# Patient Record
Sex: FEMALE | Race: OTHER | URBAN - METROPOLITAN AREA
[De-identification: names, ages, dates, MRNs, and addresses within clinical notes are randomized per-mention and may not be internally consistent; named-entity substitution may affect disease eponyms.]

---

## 2023-01-26 ENCOUNTER — HOSPITAL ENCOUNTER (OUTPATIENT)
Dept: PHYSICAL THERAPY | Age: 48
Setting detail: THERAPIES SERIES
Discharge: HOME | End: 2023-01-26
Payer: MEDICAID

## 2023-01-26 PROCEDURE — 97161 PT EVAL LOW COMPLEX 20 MIN: CPT

## 2023-01-26 PROCEDURE — 97530 THERAPEUTIC ACTIVITIES: CPT

## 2023-01-26 NOTE — PLAN OF CARE
[address] [address], [address]  Phone: [phone]   Fax: [phone]                                                       Physical Therapy Certification    Dear JOSE Spence*  ,    We had the pleasure of evaluating the following patient for physical therapy services at [address] [place] [address]. A summary of our findings can be found in the initial assessment below. This includes our plan of care. If you have any questions or concerns regarding these findings, please do not hesitate to contact me at the office phone number checked above. Thank you for the referral.       Physician Signature:_______________________________Date:__________________  By signing above (or electronic signature), therapists plan is approved by physician      Patient: Shayy To   : 1975   MRN: [id number]  Referring Physician: JOSE Spence*        Evaluation Date: 2023      Medical Diagnosis Information:  Other bursitis of knee, left knee [M70.52]   PT diagnosis:  LLE gait deviations, decreased L hip strength                                     Insurance information: PT Insurance Information: [employer]. 30 visits JOSE mcpherson required through [facility]     Precautions/ Contra-indications:   Latex Allergy:  [x]NO      []YES  Preferred Language for Healthcare:   []English       [x]Other:  Uzbek,  present for eval    C-SSRS Triggered by Intake questionnaire (Past 2 wk assessment ):   [x] No, Questionnaire did not trigger screening.   [] Yes, Patient intake triggered C-SSRS Screening     [] Completed, no further action required.    [] Completed, PCP notified via Epic    SUBJECTIVE: Patient stated complaint:  Patient reports injuring medial aspect of her L knee about 10 years ago while cleaning, bent down and felt a pop, pain has been waxing and wanning since then. Is typically able to keep in controlled, uses Tylenol, however has been worsening over the last 6 months. Had cortisone injection on 12/30/22, medial aspect of knee helped, had lateral knee joint injection Aug/Sept that did not help. PCP ordered PT to further help. Pt is on her feet for 8 hours daily for work, pain steadily increases and all tasks are too difficult due to pain afterwards. Has not noticed any swelling. Has had an x-ray previously but can't remember the results, maybe OA. All extended standing and walking tasks increase pain, and L knee pain increases while driving. Able to sleep on her L side through the night. **After assess and discussing gait pattern, pt does report significant difficulty ambulating, unable to bend L knee and walked more like she was dragging it. Most recent cortisone injection improved that and pt feels she is bending her knee better and almost walking normal again. Relevant Medical History: no significant PMHx      Functional Scale:       Date assessed:  LEFS: raw score = 44; dysfunction = 45%  1/26    Pain Scale: 0-7/10  Easing factors: Tylenol helps a little  Provocative factors: extended periods of standing and walking; driving or sitting around the house  Type: []Constant   [x]Intermittent  []Radiating []Localized []other:  Numbness/Tingling: none    Occupation/School: works 8 hour shifts, packs small boxes, doesn't have to lift or carry boxes.   Enjoys riding bike    Living Status/Prior Level of Function:Prior to this injury / incident, pt was independent with ADLs and IADLs, similar L knee pain for about 10 years      OBJECTIVE:   Palpation: Tenderness medial L knee joint along MCL, varus/valgus stress tests negative with good joint mobility noted   Swelling noted distal, M/L patella but with good mobility, slight tenderness    Functional Mobility/Transfers: IND   Significant difficulty for pt to squat or lunge without excessive knee flexion. With max verbal, visual and manual cues, pt able to improve hinge technique to better protect knee joint with both squat and lunge. Posture: L knee hyperextension during static stand    Gait: pt snaps L knee into TKE just prior to initial contact      Lumbar AROM screen: [x] [facility]  [] abnormal:     PROM AROM    L R L R   Hip Flexion       Hip Abduction       Hip ER       Hip IR       Knee Flexion   WNL    Knee Extension   WNL    Dorsiflexion        Plantarflexion        Inversion        Eversion            Strength (0-5) / Myotomes Left Right   Hip Flexion - supine     Hip Flexion - seated (L1-2) 4+ 4   Hip Abduction 4- 4-   Hip Extension 4- 3+   Hip ER     Hip IR     Quads (L2-4) 5 5   Hamstrings 5 5   Ankle Dorsiflexion (L4-5) 5 5   Ankle Plantarflexion (S1-2)     Ankle Inversion     Ankle Eversion (S1-2)     Great Toe Extension (L5)          Flexibility     Hamstrings (90/90) Lacking 20 deg Lacking 21 deg   ITB Angela Sages)     Quads (Ely's) [facility] WFL   Hip Flexor Binh Memos)     Piriformis length Min impaired Min impaired       Joint mobility: L knee joint   [x]Normal    []Hypo   []Hyper    Orthopaedic Special Tests  Positive  Negative  NT Comments    Hip       KIERRA / Jose's  x     FADIR       Scour       Trendelenburg  x            Knee       Lachman's / Anterior Drawer       Posterior Drawer       Varus Stress  x     Valgus Stress  x     Froylan's        Appley's       Thessaly's       Patellar Tracking              Ankle   x    Anterior Drawer       Talar Tilt       Tirado       Brad's                                        [x] Patient history, allergies, meds reviewed. Medical chart reviewed. See intake form. Review Of Systems (ROS):  [x]Performed Review of systems (Integumentary, CardioPulmonary, Neurological) by intake and observation. Intake form has been scanned into medical record.  Patient has been instructed to contact their primary care physician regarding ROS issues if not already being addressed at this time.       Co-morbidities/Complexities (which will affect course of rehabilitation):   [x]None        []Hx of COVID   Arthritic conditions   []Rheumatoid arthritis (M05.9)  []Osteoarthritis (M19.91)  []Gout   Cardiovascular conditions   []Hypertension (I10)  []Hyperlipidemia (E78.5)  []Angina pectoris (I20)  []Atherosclerosis (I70)  []Pacemaker  []Hx of CABG/stent/  cardiac surgeries   Musculoskeletal conditions   []Disc pathology   []Congenital spine pathologies   []Osteoporosis (M81.8)  []Osteopenia (M85.8)  []Scoliosis       Endocrine conditions   []Hypothyroid (E03.9)  []Hyperthyroid Gastrointestinal conditions   []Constipation (C65.93)   Metabolic conditions   []Morbid obesity (E66.01)  []Diabetes type 1(E10.65) or 2 (E11.65)   []Neuropathy (G60.9)     Cardio/Pulmonary conditions   []Asthma (J45)  []Coughing   []COPD (J44.9)  []CHF  []A-fib   Psychological Disorders  []Anxiety (F41.9)  []Depression (F32.9)   []Other:   Developmental Disorders  []Autism (F84.0)  []CP (G80)  []Down Syndrome (Q90.9)  []Developmental delay     Neurological conditions  []Prior Stroke (I69.30)  []Parkinson's (G20)  []Encephalopathy (G93.40)  []MS (G35)  []Post-polio (G14)  []SCI  []TBI  []ALS Other conditions  []Fibromyalgia (M79.7)  []Vertigo  []Syncope  []Kidney Failure  []Cancer      []currently undergoing                treatment  []Pregnancy  []Incontinence   Prior surgeries  []involved limb  []previous spinal surgery  [] section birth  []hysterectomy  []bowel / bladder surgery  []other relevant surgeries   []Other:              Barriers to/and or personal factors that will affect rehab potential:              [x]Age  []Sex    []Smoker              [x]Motivation/Lack of Motivation                        []Co-Morbidities              []Cognitive Function, education/learning barriers              []Environmental, home barriers              [x]profession/work barriers  []past PT/medical experience  []other:    Falls Risk Assessment (30 days):   [x] Falls Risk assessed and no intervention required. [] Falls Risk assessed and Patient requires intervention due to being higher risk   TUG score (>12s at risk):     [] Falls education provided, including        ASSESSMENT:  Patient is a [address] yo female who reports medial L knee joint pain of about 10 years but has recently increased. Has cortisone injection about 1 month ago that helped, Tylenol doesn't really help the pain. Patient does present with decreased LLE strength, however is not significant and knee joint AROM is WNL. Pt does demo L knee hyperextension at times during static standing and snaps L knee into TKE just prior to initial contact of gait pattern, most likely irritating medial structures of L knee. Patient can benefit from PT services to address these deficits.       Functional Impairments:     []Noted lumbar/proximal hip/LE joint hypomobility   []Decreased LE functional ROM   [x]Decreased core/proximal hip strength and neuromuscular control   [x]Decreased LE functional strength   []Reduced balance/proprioceptive control   []other:      Functional Activity Limitations (from functional questionnaire and intake)   [x]Reduced ability to tolerate prolonged functional positions   []Reduced ability or difficulty with changes of positions or transfers between positions   [x]Reduced ability to maintain good posture and demonstrate good body mechanics with sitting, bending, and lifting   []Reduced ability to sleep   [x] Reduced ability or tolerance with driving and/or computer work   []Reduced ability to perform lifting, carrying tasks   [x]Reduced ability to squat   []Reduced ability to forward bend   [x]Reduced ability to ambulate prolonged functional periods/distances/surfaces   [x]Reduced ability to ascend/descend stairs   []Reduced ability to run, hop, cut or jump   []other:    Participation Restrictions   []Reduced participation in self care activities   []Reduced participation in home management activities   [x]Reduced participation in work activities   [x]Reduced participation in social activities. []Reduced participation in sport/recreation activities. Classification :    []Signs/symptoms consistent with post-surgical status including decreased ROM, strength and function.    [x]Signs/symptoms consistent with joint sprain/strain   []Signs/symptoms consistent with patella-femoral syndrome   []Signs/symptoms consistent with knee OA/hip OA   []Signs/symptoms consistent with internal derangement of knee/Hip   [x]Signs/symptoms consistent with functional hip weakness/NMR control      []Signs/symptoms consistent with tendinitis/tendinosis    []signs/symptoms consistent with pathology which may benefit from Dry needling      []other:      Prognosis/Rehab Potential:      []Excellent   [x]Good    []Fair   []Poor    Tolerance of evaluation/treatment:    []Excellent   [x]Good    []Fair   []Poor    Physical Therapy Evaluation Complexity Justification  [x] A history of present problem with:  [x] no personal factors and/or comorbidities that impact the plan of care;  []1-2 personal factors and/or comorbidities that impact the plan of care  []3 personal factors and/or comorbidities that impact the plan of care  [x] An examination of body systems using standardized tests and measures addressing any of the following: body structures and functions (impairments), activity limitations, and/or participation restrictions;:  [x] a total of 1-2 or more elements   [] a total of 3 or more elements   [] a total of 4 or more elements   [x] A clinical presentation with:  [x] stable and/or uncomplicated characteristics   [] evolving clinical presentation with changing characteristics  [] unstable and unpredictable characteristics;   [x] Clinical decision making of [x] Low, [] moderate, [] high complexity using standardized patient assessment instrument and/or measurable assessment of functional outcome. [x] EVAL (LOW) 59331 (typically 15 minutes face-to-face)  [] EVAL (MOD) 12565 (typically 30 minutes face-to-face)  [] EVAL (HIGH) 76059 (typically 45 minutes face-to-face)  [] RE-EVAL     PLAN:   Frequency/Duration:  2 days per week for 4 Weeks:  Interventions:  [x]  Therapeutic exercise including: strength training, ROM, for Lower extremity and core   [x]  NMR activation and proprioception for LE, Glutes and Core   [x]  Manual therapy as indicated for LE, Hip and spine to include: Dry Needling/IASTM, STM, PROM, Gr I-IV mobilizations, manipulation. [x] Modalities as needed that may include: thermal agents, E-stim, Biofeedback, US, iontophoresis as indicated  [x] Patient education on joint protection, postural re-education, activity modification, progression of HEP. HEP instruction: Written HEP instructions provided and reviewed. Access Code: [email or web address]  URL: [employer]/  Date: 01/26/2023  Prepared by: Mayela Fontenotir    Exercises  Seated Hamstring Stretch - 3 x daily - 7 x weekly - 3 sets - 30\" hold  Prone Terminal Knee Extension - 2 x daily - 7 x weekly - 1 sets - 10 reps  Squat with Chair Touch - 2 x daily - 7 x weekly - 1 sets - 10 reps  Lunge with Counter Support - 2 x daily - 7 x weekly - 1 sets - 10 reps      GOALS:  Patient stated goal:  alleviate pain  [] Progressing: [] Met: [] Not Met: [] Adjusted    Therapist goals for Patient:   Short Term Goals: To be achieved in: 2 weeks  1. Independent in HEP and progression per patient tolerance, in order to prevent re-injury. [] Progressing: [] Met: [] Not Met: [] Adjusted  2. Patient will have a decrease in pain to facilitate improvement in movement, function, and ADLs as indicated by Functional Deficits. [] Progressing: [] Met: [] Not Met: [] Adjusted    Long Term Goals: To be achieved in: 6 weeks  1. Pt will improve LEFS by 9 points to reduce disability and progress towards PLOF.    [] Progressing: [] Met: [] Not Met: [] Adjusted  2. Patient will demonstrate increased B hamstring length 90/90 AROM to lacking 16 deg to allow for normal knee joint function during gait cycle. [] Progressing: [] Met: [] Not Met: [] Adjusted  3. Patient will demonstrate an increase in B hip strength to at least 4+/5 in order to assist in stabilizing L knee joint at initial contact of gait cycle. [] Progressing: [] Met: [] Not Met: [] Adjusted  4. Patient will return to functional activities including driving without increased symptoms or restriction.    [] Progressing: [] Met: [] Not Met: [] Adjusted    Electronically signed by:  Cora Florez, PT

## 2023-01-27 NOTE — FLOWSHEET NOTE
[address]  Phone: [phone]   Fax: [phone]    Physical Therapy Daily Treatment Note    Date:  2023     Patient Name:  Shayy To    :  1975  MRN: [id number]  Medical Diagnosis:  Other bursitis of knee, left knee [M70.52]  Treatment Diagnosis: LLE gait deviations, decreased L hip strength     Insurance/Certification information:  PT Insurance Information: [employer]. 30 visits [place] required through [facility]  Physician Information:  JOSE Spence*    Plan of care signed (Y/N): []  Yes [x]  No     Date of Patient follow up with Physician:      Progress Report: []  Yes  [x]  No     Date Range for reporting period:  Beginnin2023  Ending:     Progress report due (10 Rx/or 30 days whichever is less): visit #8 or  (date)     Recertification due (POC duration/ or 90 days whichever is less): visit # or 23 (date)     Visit # Insurance Allowable Auth required? Date Range   Eval +  ?  [x]  Yes, [facility]  []  No pcy       Units approved Units used Date Range          Latex Allergy:  [x]NO      []YES  Preferred Language for Healthcare:   []English       [x]other: Mongolian, video  required    Functional Scale:       Date assessed:  LEFS: raw score = 44; dysfunction = 45%                 Pain level:  0-7/10     SUBJECTIVE:  See eval    OBJECTIVE: See eval      RESTRICTIONS/PRECAUTIONS:     Exercises/Interventions:   Therapeutic Exercise (21135)  Resistance / level Sets/sec Reps Notes / Cues   Bike  TM  -side stepping  -retro       IB / HR  HSS       Prone TKE       Cables   -TKE  -3-way hip                            Gait (71849)                     Therapeutic Activities (79285)       Fwd step up to SLS                            Neuromuscular Re-ed (27661)       AirEx       Shuttle balance              Manual Intervention (52829)       Knee mobs/PROM       Tib/Fem Mobs       Patella Mobs Ankle mobs                         Modalities:     Pt. Education:  01/26/2023  -pt educated on diagnosis, prognosis and expectations for rehab  -all pt questions were answered    Home Exercise Program:  Access Code: [email or web address]  URL: [email or web address]  Date: 01/26/2023  Prepared by: Rafiq Ayon     Exercises  Seated Hamstring Stretch - 3 x daily - 7 x weekly - 3 sets - 30\" hold  Prone Terminal Knee Extension - 2 x daily - 7 x weekly - 1 sets - 10 reps  Squat with Chair Touch - 2 x daily - 7 x weekly - 1 sets - 10 reps  Lunge with Counter Support - 2 x daily - 7 x weekly - 1 sets - 10 reps    Therapeutic Exercise and NMR EXR  [] (08291) Provided verbal/tactile cueing for activities related to strengthening, flexibility, endurance, ROM for improvements in LE, proximal hip, and core control with self care, mobility, lifting, ambulation.  [] (54904) Provided verbal/tactile cueing for activities related to improving balance, coordination, kinesthetic sense, posture, motor skill, proprioception  to assist with LE, proximal hip, and core control in self care, mobility, lifting, ambulation and eccentric single leg control.   [] (65060) Therapist is in constant attendance of 2 or more patients providing skilled therapy interventions, but not providing any significant amount of measurable one-on-one time to either patient, for improvements in LE, proximal hip, and core control in self care, mobility, lifting, ambulation and eccentric single leg control.      NMR and Therapeutic Activities:    [x] (52955 or 42458) Provided verbal/tactile cueing for activities related to improving balance, coordination, kinesthetic sense, posture, motor skill, proprioception and motor activation to allow for proper function of core, proximal hip and LE with self care and ADLs  [] (75428) Gait Re-education- Provided training and instruction to the patient for proper LE, core and proximal hip recruitment and positioning and eccentric body weight control with ambulation re-education including up and down stairs     Home Exercise Program:    [x] (81404) Reviewed/Progressed HEP activities related to strengthening, flexibility, endurance, ROM of core, proximal hip and LE for functional self-care, mobility, lifting and ambulation/stair navigation   [] (80117)Reviewed/Progressed HEP activities related to improving balance, coordination, kinesthetic sense, posture, motor skill, proprioception of core, proximal hip and LE for self care, mobility, lifting, and ambulation/stair navigation      Manual Treatments:  PROM / STM / Oscillations-Mobs:  G-I, II, III, IV (PA's, Inf., Post.)  [] (52010) Provided manual therapy to mobilize LE, proximal hip and/or LS spine soft tissue/joints for the purpose of modulating pain, promoting relaxation,  increasing ROM, reducing/eliminating soft tissue swelling/inflammation/restriction, improving soft tissue extensibility and allowing for proper ROM for normal function with self care, mobility, lifting and ambulation. Modalities:  [] (92939) Vasopneumatic compression: Utilized vasopneumatic compression to decrease edema / swelling for the purpose of improving mobility and quad tone / recruitment which will allow for increased overall function including but not limited to self-care, transfers, ambulation, and ascending / descending stairs.        Charges:  Timed Code Treatment Minutes: 32   Total Treatment Minutes: 60     [x] EVAL - LOW (92904)   [] EVAL - MOD (49959)  [] EVAL - HIGH (56985)  [] RE-EVAL (39209)  [] EK(57427) x       [] Ionto  [] NMR (88899) x       [] Vaso  [] Manual (73130) x       [] Ultrasound  [x] TA x  2      [] Mech Traction (74209)  [] Aquatic Therapy x     [] ES (un) (43427):   [] Home Management Training x  [] ES(attended) (57382)   [] Dry Needling 1-2 muscles (29555):  [] Dry Needling 3+ muscles (052880  [] Group:      [] Other:     GOALS:   Patient stated goal:  alleviate pain  [] Progressing: [] Met: [] Not Met: [] Adjusted     Therapist goals for Patient:   Short Term Goals: To be achieved in: 2 weeks  1. Independent in HEP and progression per patient tolerance, in order to prevent re-injury. [] Progressing: [] Met: [] Not Met: [] Adjusted  2. Patient will have a decrease in pain to facilitate improvement in movement, function, and ADLs as indicated by Functional Deficits. [] Progressing: [] Met: [] Not Met: [] Adjusted     Long Term Goals: To be achieved in: 6 weeks  1. Pt will improve LEFS by 9 points to reduce disability and progress towards PLOF. [] Progressing: [] Met: [] Not Met: [] Adjusted  2. Patient will demonstrate increased B hamstring length 90/90 AROM to lacking 16 deg to allow for normal knee joint function during gait cycle. [] Progressing: [] Met: [] Not Met: [] Adjusted  3. Patient will demonstrate an increase in B hip strength to at least 4+/5 in order to assist in stabilizing L knee joint at initial contact of gait cycle. [] Progressing: [] Met: [] Not Met: [] Adjusted  4. Patient will return to functional activities including driving without increased symptoms or restriction. [] Progressing: [] Met: [] Not Met: [] Adjusted    Overall Progression Towards Functional goals/ Treatment Progress Update:  [] Patient is progressing as expected towards functional goals listed. [] Progression is slowed due to complexities/Impairments listed. [] Progression has been slowed due to co-morbidities.   [x] Plan just implemented, too soon to assess goals progression <30days   [] Goals require adjustment due to lack of progress  [] Patient is not progressing as expected and requires additional follow up with physician  [] Other    Persisting Functional Limitations/Impairments:  []Sitting []Standing   []Walking []Stairs   []Transfers []ADLs   []Squatting/bending []Kneeling  []Housework []Job related tasks  []Driving []Sports/Recreation   []Sleeping []Other:    ASSESSMENT: See eval  Treatment/Activity Tolerance:  [x] Pt able to complete treatment [] Patient limited by fatique  [] Patient limited by pain  [] Patient limited by other medical complications  [] Other:     Prognosis:  [x] Good [] Fair  [] Poor    Patient Requires Follow-up: [x] Yes  [] No    Return to Play:    [x]  N/A   []  Stage 1: Intro to Strength   []  Stage 2: Return to Run and Strength   []  Stage 3: Return to Jump and Strength   []  Stage 4: Dynamic Strength and Agility   []  Stage 5: Sport Specific Training     []  Ready to Return to Play, Meets All Above Stages   []  Not Ready for Return to Sports   Comments:            PLAN: See eval. PT 2x / week for 6 weeks. Pending PA  [] Continue per plan of care [] Alter current plan (see comments)  [x] Plan of care initiated [] Hold pending MD visit [] Discharge    Electronically signed by: Angely Valentino, PT, DPT      Note: If patient does not return for scheduled/ recommended follow up visits, this note will serve as a discharge from care along with most recent update on progress.

## 2023-02-09 ENCOUNTER — HOSPITAL ENCOUNTER (OUTPATIENT)
Dept: PHYSICAL THERAPY | Age: 48
Setting detail: THERAPIES SERIES
Discharge: HOME | End: 2023-02-09
Payer: MEDICAID

## 2023-02-09 PROCEDURE — 97110 THERAPEUTIC EXERCISES: CPT

## 2023-02-09 PROCEDURE — 97140 MANUAL THERAPY 1/> REGIONS: CPT

## 2023-02-09 NOTE — FLOWSHEET NOTE
[address]  Phone: [phone]   Fax: [phone]    Physical Therapy Daily Treatment Note    Date:  2023     Patient Name:  Renée Walsh    :  1975  MRN: [id number]  Medical Diagnosis:  Other bursitis of knee, left knee [M70.52]  Treatment Diagnosis: LLE gait deviations, decreased L hip strength     Insurance/Certification information:  PT Insurance Information: [employer]. 30 visits JOSE mcpherson required through [facility]  Physician Information:  ROSANNE Lackey    Plan of care signed (Y/N): []  Yes [x]  No     Date of Patient follow up with Physician:      Progress Report: []  Yes  [x]  No     Date Range for reporting period:  Beginnin2023  Ending:     Progress report due (10 Rx/or 30 days whichever is less): visit #8 or 31 (date)     Recertification due (POC duration/ or 90 days whichever is less): visit # or 23 (date)     Visit # Insurance Allowable Auth required? Date Range   Eval +  32 units, 8 visits [x]  Yes, CCP  []  No  to 3/11/23       Units approved Units used Date Range   32 3 3/11/23     Latex Allergy:  [x]NO      []YES  Preferred Language for Healthcare:   []English       [x]other: Chinese, video  required    Functional Scale:       Date assessed:  LEFS: raw score = 44; dysfunction = 45%                 Pain level:  0-7/10     SUBJECTIVE:   Has been doing okay since last session, has been performing HEP, no trouble with them, they don't cause any pain but does still have pain at the same place as it has been. Work has been going well but is now having pain in her hands.        OBJECTIVE:   : RLE crosses midline during gait w/hip ER      RESTRICTIONS/PRECAUTIONS:     Exercises/Interventions:   Therapeutic Exercise (81218)  Resistance / level Sets/sec Reps Notes / Cues   Bike  TM  -side stepping  -retro 1.0 5 mins     IB / HR  HSS  2x30\"/2x10     Prone TKE       Cables -TKE  -3-way hip   3.5, 4.5 pl  1.5 pl   1, 1  1   15 L  10 ea B                         Gait (06860)                     Therapeutic Activities (54259)       Fwd step up to SLS  Lat step up w/abd kick 6\"  6\" 1  1 10 B  10 B No UE assist  No UE assist                        Neuromuscular Re-ed (36417)       AirEx       Shuttle balance              Manual Intervention (49225)       Knee mobs/PROM       Tib/Fem Mobs       Patella Mobs into each direction, lateral gapping  2 mins     Ankle mobs       Stretches:  Hamstring 90/90  Piriformis   IT-band    3x30\" L  30\" L  3x30\" L     IASTM w/roller stick to Medial L knee joint, MCL, hip add tubercle  3 mins         Modalities:     Pt. Education:  01/26/2023  -pt educated on diagnosis, prognosis and expectations for rehab  -all pt questions were answered    Home Exercise Program:  Access Code: [email or web address]  URL: [email or web address]  Date: 01/26/2023  Prepared by: Jonas Tran     Exercises  Seated Hamstring Stretch - 3 x daily - 7 x weekly - 3 sets - 30\" hold  Prone Terminal Knee Extension - 2 x daily - 7 x weekly - 1 sets - 10 reps  Squat with Chair Touch - 2 x daily - 7 x weekly - 1 sets - 10 reps  Lunge with Counter Support - 2 x daily - 7 x weekly - 1 sets - 10 reps    Therapeutic Exercise and NMR EXR  [x] (66976) Provided verbal/tactile cueing for activities related to strengthening, flexibility, endurance, ROM for improvements in LE, proximal hip, and core control with self care, mobility, lifting, ambulation.  [] (76062) Provided verbal/tactile cueing for activities related to improving balance, coordination, kinesthetic sense, posture, motor skill, proprioception  to assist with LE, proximal hip, and core control in self care, mobility, lifting, ambulation and eccentric single leg control.   [] (43154) Therapist is in constant attendance of 2 or more patients providing skilled therapy interventions, but not providing any significant amount of measurable one-on-one time to either patient, for improvements in LE, proximal hip, and core control in self care, mobility, lifting, ambulation and eccentric single leg control. NMR and Therapeutic Activities:    [x] (37116 or 89166) Provided verbal/tactile cueing for activities related to improving balance, coordination, kinesthetic sense, posture, motor skill, proprioception and motor activation to allow for proper function of core, proximal hip and LE with self care and ADLs  [] (94768) Gait Re-education- Provided training and instruction to the patient for proper LE, core and proximal hip recruitment and positioning and eccentric body weight control with ambulation re-education including up and down stairs     Home Exercise Program:    [x] (27919) Reviewed/Progressed HEP activities related to strengthening, flexibility, endurance, ROM of core, proximal hip and LE for functional self-care, mobility, lifting and ambulation/stair navigation   [] (75311)Reviewed/Progressed HEP activities related to improving balance, coordination, kinesthetic sense, posture, motor skill, proprioception of core, proximal hip and LE for self care, mobility, lifting, and ambulation/stair navigation      Manual Treatments:  PROM / STM / Oscillations-Mobs:  G-I, II, III, IV (PA's, Inf., Post.)  [] (14969) Provided manual therapy to mobilize LE, proximal hip and/or LS spine soft tissue/joints for the purpose of modulating pain, promoting relaxation,  increasing ROM, reducing/eliminating soft tissue swelling/inflammation/restriction, improving soft tissue extensibility and allowing for proper ROM for normal function with self care, mobility, lifting and ambulation.      Modalities:  [] (56110) Vasopneumatic compression: Utilized vasopneumatic compression to decrease edema / swelling for the purpose of improving mobility and quad tone / recruitment which will allow for increased overall function including but not limited to self-care, transfers, ambulation, and ascending / descending stairs. Charges:  Timed Code Treatment Minutes: 40   Total Treatment Minutes: 40     [] EVAL - LOW (26048)   [] EVAL - MOD (30566)  [] EVAL - HIGH (15561)  [] RE-EVAL (79464)  [x] RF(46476) x  2     [] Ionto  [] NMR (09917) x       [] Vaso  [x] Manual (98835) x  1     [] Ultrasound  [] TA x        [] Mech Traction (60082)  [] Aquatic Therapy x     [] ES (un) (86486):   [] Home Management Training x  [] ES(attended) (91213)   [] Dry Needling 1-2 muscles (63160):  [] Dry Needling 3+ muscles (137758  [] Group:      [] Other:     GOALS:   Patient stated goal:  alleviate pain  [] Progressing: [] Met: [] Not Met: [] Adjusted     Therapist goals for Patient:   Short Term Goals: To be achieved in: 2 weeks  1. Independent in HEP and progression per patient tolerance, in order to prevent re-injury. [] Progressing: [] Met: [] Not Met: [] Adjusted  2. Patient will have a decrease in pain to facilitate improvement in movement, function, and ADLs as indicated by Functional Deficits. [] Progressing: [] Met: [] Not Met: [] Adjusted     Long Term Goals: To be achieved in: 6 weeks  1. Pt will improve LEFS by 9 points to reduce disability and progress towards PLOF. [] Progressing: [] Met: [] Not Met: [] Adjusted  2. Patient will demonstrate increased B hamstring length 90/90 AROM to lacking 16 deg to allow for normal knee joint function during gait cycle. [] Progressing: [] Met: [] Not Met: [] Adjusted  3. Patient will demonstrate an increase in B hip strength to at least 4+/5 in order to assist in stabilizing L knee joint at initial contact of gait cycle. [] Progressing: [] Met: [] Not Met: [] Adjusted  4. Patient will return to functional activities including driving without increased symptoms or restriction.    [] Progressing: [] Met: [] Not Met: [] Adjusted    Overall Progression Towards Functional goals/ Treatment Progress Update:  [] Patient is progressing as expected towards functional goals listed. [] Progression is slowed due to complexities/Impairments listed. [] Progression has been slowed due to co-morbidities. [x] Plan just implemented, too soon to assess goals progression <30days   [] Goals require adjustment due to lack of progress  [] Patient is not progressing as expected and requires additional follow up with physician  [] Other    Persisting Functional Limitations/Impairments:  []Sitting []Standing   []Walking []Stairs   []Transfers []ADLs   []Squatting/bending []Kneeling  []Housework []Job related tasks  []Driving []Sports/Recreation   []Sleeping []Other:    ASSESSMENT:   Patient tolerated all activity well, request increase in weight during TKE at cables, increase exercise intensity and use of unstable surfaces next visit. Continue stretches to IT-band to minimize stresses to medial L knee joint. Treatment/Activity Tolerance:  [x] Pt able to complete treatment [] Patient limited by fatique  [] Patient limited by pain  [] Patient limited by other medical complications  [] Other:     Prognosis:  [x] Good [] Fair  [] Poor    Patient Requires Follow-up: [x] Yes  [] No    Return to Play:    [x]  N/A   []  Stage 1: Intro to Strength   []  Stage 2: Return to Run and Strength   []  Stage 3: Return to Jump and Strength   []  Stage 4: Dynamic Strength and Agility   []  Stage 5: Sport Specific Training     []  Ready to Return to Play, Meets All Above Stages   []  Not Ready for Return to Sports   Comments:            PLAN: See eval. PT 2x / week for 6 weeks. Pending PA  [x] Continue per plan of care [] Alter current plan (see comments)  [] Plan of care initiated [] Hold pending MD visit [] Discharge    Electronically signed by: Fantasma Dent, PT, DPT      Note: If patient does not return for scheduled/ recommended follow up visits, this note will serve as a discharge from care along with most recent update on progress.

## 2023-02-14 ENCOUNTER — HOSPITAL ENCOUNTER (OUTPATIENT)
Dept: PHYSICAL THERAPY | Age: 48
Setting detail: THERAPIES SERIES
Discharge: HOME | End: 2023-02-14
Payer: MEDICAID

## 2023-02-14 PROCEDURE — 97112 NEUROMUSCULAR REEDUCATION: CPT

## 2023-02-14 PROCEDURE — 97110 THERAPEUTIC EXERCISES: CPT

## 2023-02-14 PROCEDURE — 97140 MANUAL THERAPY 1/> REGIONS: CPT

## 2023-02-14 PROCEDURE — 97530 THERAPEUTIC ACTIVITIES: CPT

## 2023-02-14 NOTE — FLOWSHEET NOTE
[address]  Phone: [phone]   Fax: [phone]    Physical Therapy Daily Treatment Note    Date:  2023     Patient Name:  Kimani Dooley    :  1975  MRN: [id number]  Medical Diagnosis:  Other bursitis of knee, left knee [M70.52]  Treatment Diagnosis: LLE gait deviations, decreased L hip strength     Insurance/Certification information:  PT Insurance Information: [employer]. 30 visits JOSE mcpherson required through [facility]  Physician Information:  ROSANNE Medina    Plan of care signed (Y/N): []  Yes [x]  No     Date of Patient follow up with Physician:      Progress Report: []  Yes  [x]  No     Date Range for reporting period:  Beginnin2023  Ending:     Progress report due (10 Rx/or 30 days whichever is less): visit #8 or  (date)     Recertification due (POC duration/ or 90 days whichever is less): visit # or 23 (date)     Visit # Insurance Allowable Auth required? Date Range   Eval + 3/8 32 units, 8 visits [x]  Yes, [facility]  []  No  to 3/11/23       Latex Allergy:  [x]NO      []YES  Preferred Language for Healthcare:   []English       [x]other: Mongolian, video  required    Functional Scale:       Date assessed:  LEFS: raw score = 44; dysfunction = 45%                 Pain level:  7/10     SUBJECTIVE:   Patient reports the L knee is hurting today. She did feel better after the second session.      OBJECTIVE:   : RLE crosses midline during gait w/hip ER  : Crepitus noted in all directions with L patellar mobs       RESTRICTIONS/PRECAUTIONS:     Exercises/Interventions:   Therapeutic Exercise (54978)  Resistance / level Sets/sec Reps Notes / Cues   Bike  TM  -side stepping  -retro 1.0 5 mins     IB / HR  HSS  2x30\"/2x10  2 x 30\" B     Prone TKE       Cables   -TKE  -3-way hip   4.5 pl  1.5 pl   1  1   15 L  10 ea B                         Gait (96564)                     Therapeutic Activities ([address])       Fwd step up to SLS  Lat step up w/abd kick 6\"  6\" 1  1 10 B  10 B No UE assist  No UE assist                        Neuromuscular Re-ed (28092)       AirEx  - NBOS w/ trunk twists  - NBOS w/ OH lifts  - SLS   Yellow ball  Yellow ball   30\"  30\"  3 x 15\" B     Shuttle balance       Tiltboard - A/P, M/L taps  1 min ea     Tiltboard - A/P, M/L balance  1 min ea                   Manual Intervention (50457)       Knee mobs/PROM       Tib/Fem Mobs       Patella Mobs into each direction, lateral gapping  2 mins     Ankle mobs       Stretches:  Hamstring 90/90  Piriformis   IT-band        IASTM w/roller stick to Medial L knee joint, MCL, hip add tubercle      K-tape Ystrip applied L knee with anchor on patellar tendon, Lateral tail at 50% around patella, medial tail <25%   6 min  0/60: Application time included pt educated on removal process, wear time       Modalities:     Pt. Education:  01/26/2023  -pt educated on diagnosis, prognosis and expectations for rehab  -all pt questions were answered    Home Exercise Program:  Access Code: [email or web address]  URL: [email or web address]  Date: 01/26/2023  Prepared by: Nikolay Santizo     Exercises  Seated Hamstring Stretch - 3 x daily - 7 x weekly - 3 sets - 30\" hold  Prone Terminal Knee Extension - 2 x daily - 7 x weekly - 1 sets - 10 reps  Squat with Chair Touch - 2 x daily - 7 x weekly - 1 sets - 10 reps  Lunge with Counter Support - 2 x daily - 7 x weekly - 1 sets - 10 reps    Therapeutic Exercise and NMR EXR  [x] (91949) Provided verbal/tactile cueing for activities related to strengthening, flexibility, endurance, ROM for improvements in LE, proximal hip, and core control with self care, mobility, lifting, ambulation.  [] (79113) Provided verbal/tactile cueing for activities related to improving balance, coordination, kinesthetic sense, posture, motor skill, proprioception  to assist with LE, proximal hip, and core control in self care, mobility, lifting, ambulation and eccentric single leg control.   [] (85211) Therapist is in constant attendance of 2 or more patients providing skilled therapy interventions, but not providing any significant amount of measurable one-on-one time to either patient, for improvements in LE, proximal hip, and core control in self care, mobility, lifting, ambulation and eccentric single leg control. NMR and Therapeutic Activities:    [x] (91562 or 87796) Provided verbal/tactile cueing for activities related to improving balance, coordination, kinesthetic sense, posture, motor skill, proprioception and motor activation to allow for proper function of core, proximal hip and LE with self care and ADLs  [] (96144) Gait Re-education- Provided training and instruction to the patient for proper LE, core and proximal hip recruitment and positioning and eccentric body weight control with ambulation re-education including up and down stairs     Home Exercise Program:    [x] (00131) Reviewed/Progressed HEP activities related to strengthening, flexibility, endurance, ROM of core, proximal hip and LE for functional self-care, mobility, lifting and ambulation/stair navigation   [] (89541)Reviewed/Progressed HEP activities related to improving balance, coordination, kinesthetic sense, posture, motor skill, proprioception of core, proximal hip and LE for self care, mobility, lifting, and ambulation/stair navigation      Manual Treatments:  PROM / STM / Oscillations-Mobs:  G-I, II, III, IV (PA's, Inf., Post.)  [] (05069) Provided manual therapy to mobilize LE, proximal hip and/or LS spine soft tissue/joints for the purpose of modulating pain, promoting relaxation,  increasing ROM, reducing/eliminating soft tissue swelling/inflammation/restriction, improving soft tissue extensibility and allowing for proper ROM for normal function with self care, mobility, lifting and ambulation.      Modalities:  [] (51900) Vasopneumatic compression: Utilized vasopneumatic compression to decrease edema / swelling for the purpose of improving mobility and quad tone / recruitment which will allow for increased overall function including but not limited to self-care, transfers, ambulation, and ascending / descending stairs. Charges:  Timed Code Treatment Minutes: 54   Total Treatment Minutes: 54     CPT Code Units approved Units used Date Range   60002 TA 32 1 1/26/23 - 3/11/23   95375 Gait 32     27943 TE 32 3    11525 Manual 32 2    31434 NMR 32 1        [] EVAL - LOW (60074)   [] EVAL - MOD (75840)  [] EVAL - HIGH (82105)  [] RE-EVAL (85396)  [x] SC(09017) x  1     [] Ionto  [x] NMR (24542) x 1       [] Vaso  [x] Manual (52227) x  1     [] Ultrasound  [x] TA x  1      [] Mech Traction (66563)  [] Aquatic Therapy x     [] ES (un) (12051):   [] Home Management Training x  [] ES(attended) (08433)   [] Dry Needling 1-2 muscles (76054):  [] Dry Needling 3+ muscles (381429  [] Group:      [] Other:     GOALS:   Patient stated goal:  alleviate pain  [] Progressing: [] Met: [] Not Met: [] Adjusted     Therapist goals for Patient:   Short Term Goals: To be achieved in: 2 weeks  1. Independent in HEP and progression per patient tolerance, in order to prevent re-injury. [] Progressing: [] Met: [] Not Met: [] Adjusted  2. Patient will have a decrease in pain to facilitate improvement in movement, function, and ADLs as indicated by Functional Deficits. [] Progressing: [] Met: [] Not Met: [] Adjusted     Long Term Goals: To be achieved in: 6 weeks  1. Pt will improve LEFS by 9 points to reduce disability and progress towards PLOF. [] Progressing: [] Met: [] Not Met: [] Adjusted  2. Patient will demonstrate increased B hamstring length 90/90 AROM to lacking 16 deg to allow for normal knee joint function during gait cycle. [] Progressing: [] Met: [] Not Met: [] Adjusted  3.  Patient will demonstrate an increase in B hip strength to at least 4+/5 in order to assist in stabilizing L knee joint at initial contact of gait cycle. [] Progressing: [] Met: [] Not Met: [] Adjusted  4. Patient will return to functional activities including driving without increased symptoms or restriction. [] Progressing: [] Met: [] Not Met: [] Adjusted    Overall Progression Towards Functional goals/ Treatment Progress Update:  [] Patient is progressing as expected towards functional goals listed. [] Progression is slowed due to complexities/Impairments listed. [] Progression has been slowed due to co-morbidities. [x] Plan just implemented, too soon to assess goals progression <30days   [] Goals require adjustment due to lack of progress  [] Patient is not progressing as expected and requires additional follow up with physician  [] Other    Persisting Functional Limitations/Impairments:  []Sitting []Standing   []Walking []Stairs   []Transfers []ADLs   []Squatting/bending []Kneeling  []Housework []Job related tasks  []Driving []Sports/Recreation   []Sleeping []Other:    ASSESSMENT:   Continued with quad and glute strengthening exercises with cues to contract glutes and quads during single leg stance activities. Patient demo's crepitus with L patellar mobs. Ktape applied to L knee for patellar support. Pt instructed in wear time, removal process, and  adverse signs to watch for. Will continue with LE flexibility and strength to reduce pain and improve pt function.    Treatment/Activity Tolerance:  [x] Pt able to complete treatment [] Patient limited by fatique  [] Patient limited by pain  [] Patient limited by other medical complications  [] Other:     Prognosis:  [x] Good [] Fair  [] Poor    Patient Requires Follow-up: [x] Yes  [] No    Return to Play:    [x]  N/A   []  Stage 1: Intro to Strength   []  Stage 2: Return to Run and Strength   []  Stage 3: Return to Jump and Strength   []  Stage 4: Dynamic Strength and Agility   []  Stage 5: Sport Specific Training     []  Ready to Return to Play, Meets All Above Stages   []  Not Ready for Return to Sports   Comments:            PLAN: See eval. PT 2x / week for 6 weeks. Pending PA  [x] Continue per plan of care [] Alter current plan (see comments)  [] Plan of care initiated [] Hold pending MD visit [] Discharge    Electronically signed by: Sarah Brooks PT, DPT      Note: If patient does not return for scheduled/ recommended follow up visits, this note will serve as a discharge from care along with most recent update on progress.

## 2023-02-18 ENCOUNTER — APPOINTMENT (OUTPATIENT)
Dept: PHYSICAL THERAPY | Age: 48
End: 2023-02-18
Payer: MEDICAID

## 2023-02-21 ENCOUNTER — HOSPITAL ENCOUNTER (OUTPATIENT)
Dept: PHYSICAL THERAPY | Age: 48
Setting detail: THERAPIES SERIES
Discharge: HOME | End: 2023-02-21
Payer: MEDICAID

## 2023-02-21 PROCEDURE — 97530 THERAPEUTIC ACTIVITIES: CPT

## 2023-02-21 PROCEDURE — 97112 NEUROMUSCULAR REEDUCATION: CPT

## 2023-02-21 PROCEDURE — 97110 THERAPEUTIC EXERCISES: CPT

## 2023-02-21 NOTE — FLOWSHEET NOTE
[address]  Phone: [phone]   Fax: [phone]    Physical Therapy Daily Treatment Note    Date:  2023     Patient Name:  Den Noble    :  1975  MRN: [id number]  Medical Diagnosis:  Other bursitis of knee, left knee [M70.52]  Treatment Diagnosis: LLE gait deviations, decreased L hip strength     Insurance/Certification information:  PT Insurance Information: [employer]. 30 visits JOSE mcpherson required through [facility]  Physician Information:  ROSANNE Duncan    Plan of care signed (Y/N): []  Yes [x]  No     Date of Patient follow up with Physician:      Progress Report: []  Yes  [x]  No     Date Range for reporting period:  Beginnin2023  Ending:     Progress report due (10 Rx/or 30 days whichever is less): visit #8 or 62 (date)     Recertification due (POC duration/ or 90 days whichever is less): visit # or 23 (date)     Visit # Insurance Allowable Auth required? Date Range   Eval +  32 units, 8 visits [x]  Yes, [facility]  []  No  to 3/11/23       Latex Allergy:  [x]NO      []YES  Preferred Language for Healthcare:   []English       [x]other: Ukrainian, video  required    Functional Scale:       Date assessed:  LEFS: raw score = 44; dysfunction = 45%                 Pain level:  4/10     SUBJECTIVE:   Video  used. Pt reports her knees don't hurt that much today. After last session she did hurt a lot but she thinks that was more due to walking at work. Did feel the Ktape helped last session.      OBJECTIVE:   : RLE crosses midline during gait w/hip ER  : Crepitus noted in all directions with L patellar mobs       RESTRICTIONS/PRECAUTIONS:     Exercises/Interventions:   Therapeutic Exercise (17992)  Resistance / level Sets/sec Reps Notes / Cues   Bike  TM  -side stepping  -retro 1.0 5 mins     IB / HR  HSS  2x30\"/2x10  2 x 30\" B     Prone TKE       Cables   -TKE  -3-way hip 4.5 pl  1.5 pl                         Gait (06231)                     Therapeutic Activities (67428)       Fwd step up to SLS  Lat step up w/abd kick 6\"  6\" 1  1 10 B  10 B No UE assist  No UE assist                        Neuromuscular Re-ed (88594)       AirEx  - NBOS w/ trunk twists  - NBOS w/ OH lifts  - SLS   Yellow ball  Yellow ball   3 x 15\" B     Shuttle balance       Tiltboard - A/P, M/L taps  1 min ea     Tiltboard - A/P, M/L balance  1 min ea     Lunges forward on Bosu Blue side  10 B    Lateral lunges on Bosu Blue side   10 B    Weight shifts right/left on Bosu Black side 1 min     Squats on Bosu Black side  20           Manual Intervention (88951)       Knee mobs/PROM       Tib/Fem Mobs       Patella Mobs into each direction, lateral gapping      Ankle mobs       Stretches:  Hamstring 90/90  Piriformis   IT-band        IASTM w/roller stick to Medial L knee joint, MCL, hip add tubercle      K-tape Ystrip applied L knee with anchor on patellar tendon, Lateral tail at 50% around patella, medial tail <25%   4 min  3/61: Application time included pt educated on removal process, wear time       Modalities:     Pt. Education:  01/26/2023  -pt educated on diagnosis, prognosis and expectations for rehab  -all pt questions were answered    Home Exercise Program:  Access Code: [email or web address]  URL: [email or web address]  Date: 01/26/2023  Prepared by: Leigh Hall     Exercises  Seated Hamstring Stretch - 3 x daily - 7 x weekly - 3 sets - 30\" hold  Prone Terminal Knee Extension - 2 x daily - 7 x weekly - 1 sets - 10 reps  Squat with Chair Touch - 2 x daily - 7 x weekly - 1 sets - 10 reps  Lunge with Counter Support - 2 x daily - 7 x weekly - 1 sets - 10 reps    Therapeutic Exercise and NMR EXR  [x] (53158) Provided verbal/tactile cueing for activities related to strengthening, flexibility, endurance, ROM for improvements in LE, proximal hip, and core control with self care, mobility, lifting, ambulation.  [] (89437) Provided verbal/tactile cueing for activities related to improving balance, coordination, kinesthetic sense, posture, motor skill, proprioception  to assist with LE, proximal hip, and core control in self care, mobility, lifting, ambulation and eccentric single leg control.   [] (38428) Therapist is in constant attendance of 2 or more patients providing skilled therapy interventions, but not providing any significant amount of measurable one-on-one time to either patient, for improvements in LE, proximal hip, and core control in self care, mobility, lifting, ambulation and eccentric single leg control.      NMR and Therapeutic Activities:    [x] (42655 or 96514) Provided verbal/tactile cueing for activities related to improving balance, coordination, kinesthetic sense, posture, motor skill, proprioception and motor activation to allow for proper function of core, proximal hip and LE with self care and ADLs  [] (70982) Gait Re-education- Provided training and instruction to the patient for proper LE, core and proximal hip recruitment and positioning and eccentric body weight control with ambulation re-education including up and down stairs     Home Exercise Program:    [] (10916) Reviewed/Progressed HEP activities related to strengthening, flexibility, endurance, ROM of core, proximal hip and LE for functional self-care, mobility, lifting and ambulation/stair navigation   [] (39896)Reviewed/Progressed HEP activities related to improving balance, coordination, kinesthetic sense, posture, motor skill, proprioception of core, proximal hip and LE for self care, mobility, lifting, and ambulation/stair navigation      Manual Treatments:  PROM / STM / Oscillations-Mobs:  G-I, II, III, IV (PA's, Inf., Post.)  [] (53540) Provided manual therapy to mobilize LE, proximal hip and/or LS spine soft tissue/joints for the purpose of modulating pain, promoting relaxation,  increasing ROM, reducing/eliminating soft tissue swelling/inflammation/restriction, improving soft tissue extensibility and allowing for proper ROM for normal function with self care, mobility, lifting and ambulation. Modalities:  [] (05623) Vasopneumatic compression: Utilized vasopneumatic compression to decrease edema / swelling for the purpose of improving mobility and quad tone / recruitment which will allow for increased overall function including but not limited to self-care, transfers, ambulation, and ascending / descending stairs. Charges:  Timed Code Treatment Minutes: 42   Total Treatment Minutes: 42     CPT Code Units approved Units used Date Range   87913 TA 32 2 1/26/23 - 3/11/23   55709 Gait 32     66962 TE 32 4    51565 Manual 32 2    45154 NMR 32 2        [] EVAL - LOW (14816)   [] EVAL - MOD (49964)  [] EVAL - HIGH (93547)  [] RE-EVAL (51859)  [x] BM(69608) x  1     [] Ionto  [x] NMR (00725) x 1       [] Vaso  [] Manual (59786) x      [] Ultrasound  [x] TA x  1      [] Mech Traction (85706)  [] Aquatic Therapy x     [] ES (un) (84416):   [] Home Management Training x  [] ES(attended) (87497)   [] Dry Needling 1-2 muscles (63339):  [] Dry Needling 3+ muscles (288057  [] Group:      [] Other:     GOALS:   Patient stated goal:  alleviate pain  [] Progressing: [] Met: [] Not Met: [] Adjusted     Therapist goals for Patient:   Short Term Goals: To be achieved in: 2 weeks  1. Independent in HEP and progression per patient tolerance, in order to prevent re-injury. [] Progressing: [] Met: [] Not Met: [] Adjusted  2. Patient will have a decrease in pain to facilitate improvement in movement, function, and ADLs as indicated by Functional Deficits. [] Progressing: [] Met: [] Not Met: [] Adjusted     Long Term Goals: To be achieved in: 6 weeks  1. Pt will improve LEFS by 9 points to reduce disability and progress towards PLOF. [] Progressing: [] Met: [] Not Met: [] Adjusted  2.  Patient will demonstrate increased B hamstring length 90/90 AROM to lacking 16 deg to allow for normal knee joint function during gait cycle. [] Progressing: [] Met: [] Not Met: [] Adjusted  3. Patient will demonstrate an increase in B hip strength to at least 4+/5 in order to assist in stabilizing L knee joint at initial contact of gait cycle. [] Progressing: [] Met: [] Not Met: [] Adjusted  4. Patient will return to functional activities including driving without increased symptoms or restriction. [] Progressing: [] Met: [] Not Met: [] Adjusted    Overall Progression Towards Functional goals/ Treatment Progress Update:  [] Patient is progressing as expected towards functional goals listed. [] Progression is slowed due to complexities/Impairments listed. [] Progression has been slowed due to co-morbidities. [x] Plan just implemented, too soon to assess goals progression <30days   [] Goals require adjustment due to lack of progress  [] Patient is not progressing as expected and requires additional follow up with physician  [] Other    Persisting Functional Limitations/Impairments:  []Sitting []Standing   []Walking []Stairs   []Transfers []ADLs   []Squatting/bending []Kneeling  []Housework []Job related tasks  []Driving []Sports/Recreation   []Sleeping []Other:    ASSESSMENT:   COntinued with quad and glute strengthening this date with added Bosu activities. Reapplied Ktape as pt had relief last session. Will continue with strength as pt tolerates to return to PLOF.     Treatment/Activity Tolerance:  [x] Pt able to complete treatment [] Patient limited by fatique  [] Patient limited by pain  [] Patient limited by other medical complications  [] Other:     Prognosis:  [x] Good [] Fair  [] Poor    Patient Requires Follow-up: [x] Yes  [] No    Return to Play:    [x]  N/A   []  Stage 1: Intro to Strength   []  Stage 2: Return to Run and Strength   []  Stage 3: Return to Jump and Strength   []  Stage 4: Dynamic Strength and Agility   []  Stage 5: Sport Specific Training     []  Ready to Return to Play, Meets All Above Stages   []  Not Ready for Return to Sports   Comments:            PLAN: See eval. PT 2x / week for 6 weeks. Pending PA  [x] Continue per plan of care [] Alter current plan (see comments)  [] Plan of care initiated [] Hold pending MD visit [] Discharge    Electronically signed by: Gloria Sams PT, DPT      Note: If patient does not return for scheduled/ recommended follow up visits, this note will serve as a discharge from care along with most recent update on progress.

## 2023-02-23 ENCOUNTER — HOSPITAL ENCOUNTER (OUTPATIENT)
Dept: PHYSICAL THERAPY | Age: 48
Setting detail: THERAPIES SERIES
Discharge: HOME | End: 2023-02-23
Payer: MEDICAID

## 2023-02-23 PROCEDURE — 97110 THERAPEUTIC EXERCISES: CPT

## 2023-02-23 PROCEDURE — 97530 THERAPEUTIC ACTIVITIES: CPT

## 2023-02-23 NOTE — PROGRESS NOTES
[address]  Phone: [phone]   Fax: [phone]    Physical Therapy Daily Treatment Note    Date:  2023     Patient Name:  Kendrick Terry    :  1975  MRN: [id number]  Medical Diagnosis:  Other bursitis of knee, left knee [M70.52]  Treatment Diagnosis: LLE gait deviations, decreased L hip strength     Insurance/Certification information:  PT Insurance Information: [employer]. 30 visits JOSE mcpherson required through [facility]  Physician Information:  JOSE Kim*    Plan of care signed (Y/N): []  Yes [x]  No     Date of Patient follow up with Physician:      Progress Report: [x]  Yes  []  No     Date Range for reporting period:  Beginnin2023  PN:   Ending:     Progress report due (10 Rx/or 30 days whichever is less): visit #8 or  (date)     Recertification due (POC duration/ or 90 days whichever is less): visit # or 23 (date)     Visit # Insurance Allowable Auth required? Date Range   Eval +  32 units, 8 visits [x]  Yes, [facility]  []  No  to 3/11/23       Latex Allergy:  [x]NO      []YES  Preferred Language for Healthcare:   []English       [x]other: Vietnamese, video  required    Functional Scale:       Date assessed:  LEFS: raw score = 44; dysfunction = 45%                 Pain level:  4/10     SUBJECTIVE:   Video  used. Doing well today, pain is about the same, feels that K-tape is helping, still has it on from last session. Does feel that PT is helping. Can't report anything that continues to give her difficulty at home or here in clinic. Driving has gotten better, doesn't hurt as much.       OBJECTIVE:   : Supine hamstring 90/90: lacking 10 deg R, lacking 19 deg L    : RLE crosses midline during gait w/hip ER  : Crepitus noted in all directions with L patellar mobs       RESTRICTIONS/PRECAUTIONS:     Exercises/Interventions:   Therapeutic Exercise (24134) Resistance / level Sets/sec Reps Notes / Cues   Bike  TM  -side stepping  -retro 2.0 5 mins     IB / HR  HSS   6\" block 2x30\"/2x10  2 x 30\" B     Prone TKE       Cables   -TKE  -3-way hip   4.5 pl  1.5 pl    Total gym  -squats    2   10    Cybex leg press  Quantum   Knee flex  Knee ext 60#    35#  20# 1    1  1 15 L    15  15           Gait (60947)                     Therapeutic Activities (15542)       Fwd step up to SLS  Lat step up w/abd kick 6\"  6\" No UE assist  No UE assist   Fwd step up from 2nd step w/contra LE to platform  6\" 1 10 L No UE assist                 Neuromuscular Re-ed (76330)       AirEx  - NBOS w/ trunk twists  - NBOS w/ OH lifts  - SLS   Yellow ball  Yellow ball       Shuttle balance       Tiltboard - A/P, M/L taps      Tiltboard - A/P, M/L balance      Walking lunges 40'  1 2/23: demo good form following initial instruction   Lunges forward on Bosu Blue side     Lateral lunges on Bosu Blue side      Weight shifts right/left on Bosu Black side     Squats on Bosu Black side  15    Fwd step ups to SLS on BOSU  Lat step ups to SLS on [employer]   10 B  10 B    Manual Intervention (17890)       Knee mobs/PROM       Tib/Fem Mobs       Patella Mobs into each direction, lateral gapping      Ankle mobs       Stretches:  Hamstring 90/90  Piriformis   IT-band        IASTM w/roller stick to Medial L knee joint, MCL, hip add tubercle      K-tape Ystrip applied L knee with anchor on patellar tendon, Lateral tail at 50% around patella, medial tail <87%    5/58: Application time included pt educated on removal process, wear time       Modalities:     Pt. Education:  01/26/2023  -pt educated on diagnosis, prognosis and expectations for rehab  -all pt questions were answered    Home Exercise Program:  Access Code: [email or web address]  URL: [email or web address]  Date: 01/26/2023  Prepared by: Desirae King     Exercises  Seated Hamstring Stretch - 3 x daily - 7 x weekly - 3 sets - 30\" hold  Prone Terminal Knee Extension - 2 x daily - 7 x weekly - 1 sets - 10 reps  Squat with Chair Touch - 2 x daily - 7 x weekly - 1 sets - 10 reps  Lunge with Counter Support - 2 x daily - 7 x weekly - 1 sets - 10 reps    Therapeutic Exercise and NMR EXR  [x] (04036) Provided verbal/tactile cueing for activities related to strengthening, flexibility, endurance, ROM for improvements in LE, proximal hip, and core control with self care, mobility, lifting, ambulation.  [] (44596) Provided verbal/tactile cueing for activities related to improving balance, coordination, kinesthetic sense, posture, motor skill, proprioception  to assist with LE, proximal hip, and core control in self care, mobility, lifting, ambulation and eccentric single leg control.   [] (53179) Therapist is in constant attendance of 2 or more patients providing skilled therapy interventions, but not providing any significant amount of measurable one-on-one time to either patient, for improvements in LE, proximal hip, and core control in self care, mobility, lifting, ambulation and eccentric single leg control.      NMR and Therapeutic Activities:    [x] (44887 or 64595) Provided verbal/tactile cueing for activities related to improving balance, coordination, kinesthetic sense, posture, motor skill, proprioception and motor activation to allow for proper function of core, proximal hip and LE with self care and ADLs  [] (72871) Gait Re-education- Provided training and instruction to the patient for proper LE, core and proximal hip recruitment and positioning and eccentric body weight control with ambulation re-education including up and down stairs     Home Exercise Program:    [] (76452) Reviewed/Progressed HEP activities related to strengthening, flexibility, endurance, ROM of core, proximal hip and LE for functional self-care, mobility, lifting and ambulation/stair navigation   [] (86912)Reviewed/Progressed HEP activities related to improving balance, coordination, kinesthetic sense, posture, motor skill, proprioception of core, proximal hip and LE for self care, mobility, lifting, and ambulation/stair navigation      Manual Treatments:  PROM / STM / Oscillations-Mobs:  G-I, II, III, IV (PA's, Inf., Post.)  [] (67133) Provided manual therapy to mobilize LE, proximal hip and/or LS spine soft tissue/joints for the purpose of modulating pain, promoting relaxation,  increasing ROM, reducing/eliminating soft tissue swelling/inflammation/restriction, improving soft tissue extensibility and allowing for proper ROM for normal function with self care, mobility, lifting and ambulation. Modalities:  [] (82336) Vasopneumatic compression: Utilized vasopneumatic compression to decrease edema / swelling for the purpose of improving mobility and quad tone / recruitment which will allow for increased overall function including but not limited to self-care, transfers, ambulation, and ascending / descending stairs. Charges:  Timed Code Treatment Minutes: 45   Total Treatment Minutes: 45     CPT Code Units approved Units used Date Range   93471 TA 32 3 1/26/23 - 3/11/23   29848 Gait 32     02360 TE 32 6    03449 Manual 32 2    60473 NMR 32 2        [] EVAL - LOW (32607)   [] EVAL - MOD (13001)  [] EVAL - HIGH (81054)  [] RE-EVAL (27773)  [x] PN(36500) x  2     [] Ionto  [] NMR (49664) x        [] Vaso  [] Manual (32021) x      [] Ultrasound  [x] TA x  1      [] Mech Traction (94913)  [] Aquatic Therapy x     [] ES (un) (33749):   [] Home Management Training x  [] ES(attended) (46026)   [] Dry Needling 1-2 muscles (85728):  [] Dry Needling 3+ muscles (082620  [] Group:      [] Other:     GOALS:   Patient stated goal:  alleviate pain  [x] Progressing: [] Met: [] Not Met: [] Adjusted     Therapist goals for Patient:   Short Term Goals: To be achieved in: 2 weeks  1. Independent in HEP and progression per patient tolerance, in order to prevent re-injury.    [] Progressing: [x] Met: [] Not Met: [] Adjusted  2. Patient will have a decrease in pain to facilitate improvement in movement, function, and ADLs as indicated by Functional Deficits. [x] Progressing: [] Met: [] Not Met: [] Adjusted     Long Term Goals: To be achieved in: 6 weeks  1. Pt will improve LEFS by 9 points to reduce disability and progress towards PLOF. [x] Progressing: [] Met: [] Not Met: [] Adjusted  2. Patient will demonstrate increased B hamstring length 90/90 AROM to lacking 16 deg to allow for normal knee joint function during gait cycle. [] Progressing: [x] Met: for R [x] Not Met: for L  [] Adjusted  3. Patient will demonstrate an increase in B hip strength to at least 4+/5 in order to assist in stabilizing L knee joint at initial contact of gait cycle. [x] Progressing: [] Met: [] Not Met: [] Adjusted  4. Patient will return to functional activities including driving without increased symptoms or restriction. [x] Progressing: [] Met: [] Not Met: [] Adjusted    Overall Progression Towards Functional goals/ Treatment Progress Update:  [x] Patient is progressing as expected towards functional goals listed. [] Progression is slowed due to complexities/Impairments listed. [] Progression has been slowed due to co-morbidities. [] Plan just implemented, too soon to assess goals progression <30days   [] Goals require adjustment due to lack of progress  [] Patient is not progressing as expected and requires additional follow up with physician  [] Other    Persisting Functional Limitations/Impairments:  []Sitting []Standing   []Walking []Stairs   []Transfers []ADLs   []Squatting/bending []Kneeling  []Housework []Job related tasks  []Driving []Sports/Recreation   []Sleeping []Other:    ASSESSMENT:    Patient's R hamstring length improved, L is about the same.   Pt demo'd good form w/walking lunglorraine following initial instruction and reported that she could be even more challenged during session, so pt performed LE machines. Assess pt's response and consider educating pt on activities in [facility] or adding TRX ex's to flow sheet. Treatment/Activity Tolerance:  [x] Pt able to complete treatment [] Patient limited by fatique  [] Patient limited by pain  [] Patient limited by other medical complications  [] Other:     Prognosis:  [x] Good [] Fair  [] Poor    Patient Requires Follow-up: [x] Yes  [] No    Return to Play:    [x]  N/A   []  Stage 1: Intro to Strength   []  Stage 2: Return to Run and Strength   []  Stage 3: Return to Jump and Strength   []  Stage 4: Dynamic Strength and Agility   []  Stage 5: Sport Specific Training     []  Ready to Return to Play, Meets All Above Stages   []  Not Ready for Return to Sports   Comments:            PLAN: See eval. PT 2x / week for 6 weeks. Pending PA  [x] Continue per plan of care [] Alter current plan (see comments)  [] Plan of care initiated [] Hold pending MD visit [] Discharge    Electronically signed by: Kira Epps PT, DPT      Note: If patient does not return for scheduled/ recommended follow up visits, this note will serve as a discharge from care along with most recent update on progress.

## 2023-02-28 ENCOUNTER — HOSPITAL ENCOUNTER (OUTPATIENT)
Dept: PHYSICAL THERAPY | Age: 48
Setting detail: THERAPIES SERIES
Discharge: HOME | End: 2023-02-28
Payer: MEDICAID

## 2023-02-28 PROCEDURE — 97530 THERAPEUTIC ACTIVITIES: CPT

## 2023-02-28 PROCEDURE — 97110 THERAPEUTIC EXERCISES: CPT

## 2023-02-28 PROCEDURE — 97112 NEUROMUSCULAR REEDUCATION: CPT

## 2023-02-28 NOTE — PROGRESS NOTES
[address]  Phone: [phone]   Fax: [phone]    Physical Therapy Daily Treatment Note    Date:  2023     Patient Name:  Calvin Peterson    :  1975  MRN: [id number]  Medical Diagnosis:  Other bursitis of knee, left knee [M70.52]  Treatment Diagnosis: LLE gait deviations, decreased L hip strength     Insurance/Certification information:  PT Insurance Information: [employer]. 30 visits JOSE mcpherson required through [facility]  Physician Information:  JOSE Mejia*    Plan of care signed (Y/N): []  Yes [x]  No     Date of Patient follow up with Physician:      Progress Report: []  Yes  [x]  No     Date Range for reporting period:  Beginnin2023  PN:   Ending:     Progress report due (10 Rx/or 30 days whichever is less): visit #8 or  (date)     Recertification due (POC duration/ or 90 days whichever is less): visit # or 23 (date)     Visit # Insurance Allowable Auth required? Date Range   Eval +  32 units, 8 visits [x]  Yes, [facility]  []  No  to 3/11/23       Latex Allergy:  [x]NO      []YES  Preferred Language for Healthcare:   []English       [x]other: [place]an, video  required    Functional Scale:       Date assessed:  LEFS: raw score = 44; dysfunction = 45%                 Pain level:  0/10     SUBJECTIVE:   Video  used. Patient reports today she feels really good. She took the Meribeth Nila off yesterday and would like it reapplied today.      OBJECTIVE:   : Supine hamstring 90/90: lacking 10 deg R, lacking 19 deg L    : RLE crosses midline during gait w/hip ER  : Crepitus noted in all directions with L patellar mobs       RESTRICTIONS/PRECAUTIONS:     Exercises/Interventions:   Therapeutic Exercise (09179)  Resistance / level Sets/sec Reps Notes / Cues   Bike  TM  -side stepping  -retro 2.0 5 mins     IB / HR  HSS   6\" block 2x30\"/2x10  2 x 30\" B     Prone TKE Cables   -TKE  -3-way hip   4.5 pl  1.5 pl    Total gym  -squats     Cybex leg press  Quantum   Knee flex  Knee ext 60#    30#  30# 1    1  1 15 L    15  15    Cybex leg press -double leg (plate setting 4 413# 1 10                          Gait (65307)                     Therapeutic Activities (07493)       Fwd step up to SLS  Lat step up w/abd kick 6\"  6\" No UE assist  No UE assist   Fwd step up from 2nd step w/contra LE to platform  6\" 1 10 L No UE assist   Side stepping  Lime TB 20 ft B                          Neuromuscular Re-ed (14570)       AirEx  - NBOS w/ trunk twists  - NBOS w/ OH lifts  - SLS   Yellow ball  Yellow ball       Shuttle balance       Tiltboard - A/P, M/L taps      Tiltboard - A/P, M/L balance      Walking lunges 40'  2/23: demo good form following initial instruction   Lunges forward on Bosu Blue side     Lateral lunges on Bosu Blue side      Weight shifts right/left on Bosu Black side 1 min     Squats on Bosu Black side  20    Fwd step ups to SLS on BOSU  Lat step ups to SLS on [employer]   10 B  10 B                                Manual Intervention (57120)       Knee mobs/PROM       Tib/Fem Mobs       Patella Mobs into each direction, lateral gapping      Ankle mobs       Stretches:  Hamstring 90/90  Piriformis   IT-band        IASTM w/roller stick to Medial L knee joint, MCL, hip add tubercle      K-tape Ystrip applied L knee with anchor on patellar tendon, Lateral tail at 50% around patella, medial tail <25%   4 min  8/38: Application time included pt educated on removal process, wear time       Modalities:     Pt. Education:  01/26/2023  -pt educated on diagnosis, prognosis and expectations for rehab  -all pt questions were answered    Home Exercise Program:  Access Code: [email or web address]  URL: [email or web address]  Date: 01/26/2023  Prepared by: Liana Magaña     Exercises  Seated Hamstring Stretch - 3 x daily - 7 x weekly - 3 sets - 30\" hold  Prone Terminal Knee Extension - 2 x daily - 7 x weekly - 1 sets - 10 reps  Squat with Chair Touch - 2 x daily - 7 x weekly - 1 sets - 10 reps  Lunge with Counter Support - 2 x daily - 7 x weekly - 1 sets - 10 reps    Therapeutic Exercise and NMR EXR  [x] (38999) Provided verbal/tactile cueing for activities related to strengthening, flexibility, endurance, ROM for improvements in LE, proximal hip, and core control with self care, mobility, lifting, ambulation.  [] (96095) Provided verbal/tactile cueing for activities related to improving balance, coordination, kinesthetic sense, posture, motor skill, proprioception  to assist with LE, proximal hip, and core control in self care, mobility, lifting, ambulation and eccentric single leg control.   [] (79723) Therapist is in constant attendance of 2 or more patients providing skilled therapy interventions, but not providing any significant amount of measurable one-on-one time to either patient, for improvements in LE, proximal hip, and core control in self care, mobility, lifting, ambulation and eccentric single leg control.      NMR and Therapeutic Activities:    [x] (55123 or 53428) Provided verbal/tactile cueing for activities related to improving balance, coordination, kinesthetic sense, posture, motor skill, proprioception and motor activation to allow for proper function of core, proximal hip and LE with self care and ADLs  [] (28788) Gait Re-education- Provided training and instruction to the patient for proper LE, core and proximal hip recruitment and positioning and eccentric body weight control with ambulation re-education including up and down stairs     Home Exercise Program:    [] (83566) Reviewed/Progressed HEP activities related to strengthening, flexibility, endurance, ROM of core, proximal hip and LE for functional self-care, mobility, lifting and ambulation/stair navigation   [] (69465)Reviewed/Progressed HEP activities related to improving balance, coordination, kinesthetic sense, posture, motor skill, proprioception of core, proximal hip and LE for self care, mobility, lifting, and ambulation/stair navigation      Manual Treatments:  PROM / STM / Oscillations-Mobs:  G-I, II, III, IV (PA's, Inf., Post.)  [] (86788) Provided manual therapy to mobilize LE, proximal hip and/or LS spine soft tissue/joints for the purpose of modulating pain, promoting relaxation,  increasing ROM, reducing/eliminating soft tissue swelling/inflammation/restriction, improving soft tissue extensibility and allowing for proper ROM for normal function with self care, mobility, lifting and ambulation. Modalities:  [] (65794) Vasopneumatic compression: Utilized vasopneumatic compression to decrease edema / swelling for the purpose of improving mobility and quad tone / recruitment which will allow for increased overall function including but not limited to self-care, transfers, ambulation, and ascending / descending stairs. Charges:  Timed Code Treatment Minutes: 46   Total Treatment Minutes: 46     CPT Code Units approved Units used Date Range   56244 TA 32 4 1/26/23 - 3/11/23   28425 Gait 32     30187 TE 32 7    43752 Manual 32 2    94058 NMR 32 3        [] EVAL - LOW (88368)   [] EVAL - MOD (33768)  [] EVAL - HIGH (73635)  [] RE-EVAL (25914)  [x] CORRIE(77585) x  1     [] Ionto  [x] NMR (76939) x  1      [] Vaso  [] Manual (42640) x      [] Ultrasound  [x] TA x  1      [] Mech Traction (98069)  [] Aquatic Therapy x     [] ES (un) (82968):   [] Home Management Training x  [] ES(attended) (28892)   [] Dry Needling 1-2 muscles (48884):  [] Dry Needling 3+ muscles (173363  [] Group:      [] Other:     GOALS:   Patient stated goal:  alleviate pain  [x] Progressing: [] Met: [] Not Met: [] Adjusted     Therapist goals for Patient:   Short Term Goals: To be achieved in: 2 weeks  1. Independent in HEP and progression per patient tolerance, in order to prevent re-injury.    [] Progressing: [x] Met: [] Not Met: [] Adjusted  2. Patient will have a decrease in pain to facilitate improvement in movement, function, and ADLs as indicated by Functional Deficits. [x] Progressing: [] Met: [] Not Met: [] Adjusted     Long Term Goals: To be achieved in: 6 weeks  1. Pt will improve LEFS by 9 points to reduce disability and progress towards PLOF. [x] Progressing: [] Met: [] Not Met: [] Adjusted  2. Patient will demonstrate increased B hamstring length 90/90 AROM to lacking 16 deg to allow for normal knee joint function during gait cycle. [] Progressing: [x] Met: for R [x] Not Met: for L  [] Adjusted  3. Patient will demonstrate an increase in B hip strength to at least 4+/5 in order to assist in stabilizing L knee joint at initial contact of gait cycle. [x] Progressing: [] Met: [] Not Met: [] Adjusted  4. Patient will return to functional activities including driving without increased symptoms or restriction. [x] Progressing: [] Met: [] Not Met: [] Adjusted    Overall Progression Towards Functional goals/ Treatment Progress Update:  [x] Patient is progressing as expected towards functional goals listed. [] Progression is slowed due to complexities/Impairments listed. [] Progression has been slowed due to co-morbidities. [] Plan just implemented, too soon to assess goals progression <30days   [] Goals require adjustment due to lack of progress  [] Patient is not progressing as expected and requires additional follow up with physician  [] Other    Persisting Functional Limitations/Impairments:  []Sitting []Standing   []Walking []Stairs   []Transfers []ADLs   []Squatting/bending []Kneeling  []Housework []Job related tasks  []Driving []Sports/Recreation   []Sleeping []Other:    ASSESSMENT:   Continued with LE strengthening and Bosu activities for LE stability. Added side stepping with TB and provided TB for home. Reapplied Ktape as pt reports continued relief with this.  If pt continues to be pain free may be ready for DC at end of current POC. Treatment/Activity Tolerance:  [x] Pt able to complete treatment [] Patient limited by fatique  [] Patient limited by pain  [] Patient limited by other medical complications  [] Other:     Prognosis:  [x] Good [] Fair  [] Poor    Patient Requires Follow-up: [x] Yes  [] No    Return to Play:    [x]  N/A   []  Stage 1: Intro to Strength   []  Stage 2: Return to Run and Strength   []  Stage 3: Return to Jump and Strength   []  Stage 4: Dynamic Strength and Agility   []  Stage 5: Sport Specific Training     []  Ready to Return to Play, Meets All Above Stages   []  Not Ready for Return to Sports   Comments:            PLAN: See eval. PT 2x / week for 6 weeks. Pending PA  [x] Continue per plan of care [] Alter current plan (see comments)  [] Plan of care initiated [] Hold pending MD visit [] Discharge    Electronically signed by: Jason Mendez, PT, DPT      Note: If patient does not return for scheduled/ recommended follow up visits, this note will serve as a discharge from care along with most recent update on progress.

## 2023-03-02 ENCOUNTER — HOSPITAL ENCOUNTER (OUTPATIENT)
Dept: PHYSICAL THERAPY | Age: 48
Setting detail: THERAPIES SERIES
Discharge: HOME | End: 2023-03-02
Payer: MEDICAID

## 2023-03-02 PROCEDURE — 97110 THERAPEUTIC EXERCISES: CPT

## 2023-03-02 NOTE — PROGRESS NOTES
[address]  Phone: [phone]   Fax: [phone]    Physical Therapy Daily Treatment Note    Date:  2023     Patient Name:  French Mijares    :  1975  MRN: [id number]  Medical Diagnosis:  Other bursitis of knee, left knee [M70.52]  Treatment Diagnosis: LLE gait deviations, decreased L hip strength     Insurance/Certification information:  PT Insurance Information: [employer]. 30 visits JOSE mcpherson required through [facility]  Physician Information:  JOSE Apple*    Plan of care signed (Y/N): []  Yes [x]  No     Date of Patient follow up with Physician:      Progress Report: []  Yes  [x]  No     Date Range for reporting period:  Beginnin2023  PN:   Ending:     Progress report due (10 Rx/or 30 days whichever is less): visit #8 or  (date)     Recertification due (POC duration/ or 90 days whichever is less): visit # or 23 (date)     Visit # Insurance Allowable Auth required? Date Range   Eval +  32 units, 8 visits [x]  Yes, [facility]  []  No  to 3/11/23       Latex Allergy:  [x]NO      []YES  Preferred Language for Healthcare:   []English       [x]other: [place] African, video  required    Functional Scale:       Date assessed:  LEFS: raw score = 44; dysfunction = 45%                 Pain level:  3/10     SUBJECTIVE:   Video  used. Doing well today, just a little more pain than usual.  Feels that she has enough ex's to do at home but is interested in learning more machines in [facility] in preparation of discharge. Reports taking K-tape off yesterday as it already started to come off.       OBJECTIVE:   : Supine hamstring 90/90: lacking 10 deg R, lacking 19 deg L    : RLE crosses midline during gait w/hip ER  : Crepitus noted in all directions with L patellar mobs       RESTRICTIONS/PRECAUTIONS:     Exercises/Interventions:   Therapeutic Exercise (23300)  Resistance / level Sets/sec Reps Notes / Cues   Bike  TM  -side stepping  -retro 2.0    0.6 mph   2.5' B     IB / HR  HSS   6\" block 2x30\"/2x10      Prone TKE       Cables   -TKE  -3-way hip   4.5 pl  1.5 pl    Total gym  -squats     Cybex leg press  Quantum   Knee flex  Knee ext 60#    30#  30#    Cybex leg press -double leg (plate setting 4 380#    [facility]  Navigated 2 flights of stairs reciprocally    Cybex  Leg press      -LLE only  Knee flex  Hip abd  Knee ext    Precore elliptical            110#  70#  50#  60#  30#    Fwd/bwd   1        1  1  1  2  1    4'/1'           15  15  15  15  15                  Gait (15934)                     Therapeutic Activities (99815)       Fwd step up to SLS  Lat step up w/abd kick 6\"  6\" No UE assist  No UE assist   Fwd step up from 2nd step w/contra LE to platform  6\" No UE assist   Side stepping  Lime TB 20 ft B                          Neuromuscular Re-ed (91189)       AirEx  - NBOS w/ trunk twists  - NBOS w/ OH lifts  - SLS   Yellow ball  Yellow ball       Shuttle balance       Tiltboard - A/P, M/L taps      Tiltboard - A/P, M/L balance      Walking lunges 40'  2/23: demo good form following initial instruction   Lunges forward on Bosu Blue side     Lateral lunges on Bosu Blue side      Weight shifts right/left on Bosu Black side    Squats on Bosu Black side    Fwd step ups to SLS on BOSU  Lat step ups to SLS on [employer]                                 Manual Intervention (32852)       Knee mobs/PROM       Tib/Fem Mobs       Patella Mobs into each direction,  2 mins     Ankle mobs       Stretches:  Hamstring 90/90  Piriformis   IT-band    3x30\" L     IASTM w/roller stick to Medial L knee joint, MCL, hip add tubercle      K-tape Ystrip applied L knee with anchor on patellar tendon, Lateral tail at 50% around patella, medial tail <73%    0/66: Application time included pt educated on removal process, wear time       Modalities:     Pt. Education:  01/26/2023  -pt educated on diagnosis, prognosis and expectations for rehab  -all pt questions were answered    Home Exercise Program:  Access Code: [email or web address]  URL: [email or web address]  Date: 01/26/2023  Prepared by: Shelby Flores     Exercises  Seated Hamstring Stretch - 3 x daily - 7 x weekly - 3 sets - 30\" hold  Prone Terminal Knee Extension - 2 x daily - 7 x weekly - 1 sets - 10 reps  Squat with Chair Touch - 2 x daily - 7 x weekly - 1 sets - 10 reps  Lunge with Counter Support - 2 x daily - 7 x weekly - 1 sets - 10 reps    Therapeutic Exercise and NMR EXR  [x] (33490) Provided verbal/tactile cueing for activities related to strengthening, flexibility, endurance, ROM for improvements in LE, proximal hip, and core control with self care, mobility, lifting, ambulation.  [] (96803) Provided verbal/tactile cueing for activities related to improving balance, coordination, kinesthetic sense, posture, motor skill, proprioception  to assist with LE, proximal hip, and core control in self care, mobility, lifting, ambulation and eccentric single leg control.   [] (20179) Therapist is in constant attendance of 2 or more patients providing skilled therapy interventions, but not providing any significant amount of measurable one-on-one time to either patient, for improvements in LE, proximal hip, and core control in self care, mobility, lifting, ambulation and eccentric single leg control.      NMR and Therapeutic Activities:    [x] (61346 or 22486) Provided verbal/tactile cueing for activities related to improving balance, coordination, kinesthetic sense, posture, motor skill, proprioception and motor activation to allow for proper function of core, proximal hip and LE with self care and ADLs  [] (22510) Gait Re-education- Provided training and instruction to the patient for proper LE, core and proximal hip recruitment and positioning and eccentric body weight control with ambulation re-education including up and down stairs     Home Exercise Program:    [] (54743) Reviewed/Progressed HEP activities related to strengthening, flexibility, endurance, ROM of core, proximal hip and LE for functional self-care, mobility, lifting and ambulation/stair navigation   [] (83997)Reviewed/Progressed HEP activities related to improving balance, coordination, kinesthetic sense, posture, motor skill, proprioception of core, proximal hip and LE for self care, mobility, lifting, and ambulation/stair navigation      Manual Treatments:  PROM / STM / Oscillations-Mobs:  G-I, II, III, IV (PA's, Inf., Post.)  [] (74243) Provided manual therapy to mobilize LE, proximal hip and/or LS spine soft tissue/joints for the purpose of modulating pain, promoting relaxation,  increasing ROM, reducing/eliminating soft tissue swelling/inflammation/restriction, improving soft tissue extensibility and allowing for proper ROM for normal function with self care, mobility, lifting and ambulation.     Modalities:  [] (97958) Vasopneumatic compression: Utilized vasopneumatic compression to decrease edema / swelling for the purpose of improving mobility and quad tone / recruitment which will allow for increased overall function including but not limited to self-care, transfers, ambulation, and ascending / descending stairs.       Charges:  Timed Code Treatment Minutes: 43   Total Treatment Minutes: 43     CPT Code Units approved Units used Date Range   49829 TA 32 4 1/26/23 - 3/11/23   38277 Gait 32     56874 TE 32 10    96825 Manual 32 2    52359 NMR 32 3        [] EVAL - LOW (65702)   [] EVAL - MOD (48223)  [] EVAL - HIGH (43673)  [] RE-EVAL (35848)  [x] TE(91181) x  3     [] Ionto  [] NMR (00024) x        [] Vaso  [] Manual (42006) x      [] Ultrasound  [] TA x        [] Mech Traction (93084)  [] Aquatic Therapy x     [] ES (un) (30615):   [] Home Management Training x  [] ES(attended) (97032)   [] Dry Needling 1-2 muscles (20560):  [] Dry Needling 3+ muscles (205601  [] Group:      []  Other:     GOALS:   Patient stated goal:  alleviate pain  [x] Progressing: [] Met: [] Not Met: [] Adjusted     Therapist goals for Patient:   Short Term Goals: To be achieved in: 2 weeks  1. Independent in HEP and progression per patient tolerance, in order to prevent re-injury. [] Progressing: [x] Met: [] Not Met: [] Adjusted  2. Patient will have a decrease in pain to facilitate improvement in movement, function, and ADLs as indicated by Functional Deficits. [x] Progressing: [] Met: [] Not Met: [] Adjusted     Long Term Goals: To be achieved in: 6 weeks  1. Pt will improve LEFS by 9 points to reduce disability and progress towards PLOF. [x] Progressing: [] Met: [] Not Met: [] Adjusted  2. Patient will demonstrate increased B hamstring length 90/90 AROM to lacking 16 deg to allow for normal knee joint function during gait cycle. [] Progressing: [x] Met: for R [x] Not Met: for L  [] Adjusted  3. Patient will demonstrate an increase in B hip strength to at least 4+/5 in order to assist in stabilizing L knee joint at initial contact of gait cycle. [x] Progressing: [] Met: [] Not Met: [] Adjusted  4. Patient will return to functional activities including driving without increased symptoms or restriction. [x] Progressing: [] Met: [] Not Met: [] Adjusted    Overall Progression Towards Functional goals/ Treatment Progress Update:  [x] Patient is progressing as expected towards functional goals listed. [] Progression is slowed due to complexities/Impairments listed. [] Progression has been slowed due to co-morbidities.   [] Plan just implemented, too soon to assess goals progression <30days   [] Goals require adjustment due to lack of progress  [] Patient is not progressing as expected and requires additional follow up with physician  [] Other    Persisting Functional Limitations/Impairments:  []Sitting []Standing   []Walking []Stairs   []Transfers []ADLs   []Squatting/bending []Kneeling  []Housework []Job related tasks  []Driving []Sports/Recreation   []Sleeping []Other:    ASSESSMENT:   Patient tolerated all ex's well in [facility] with reported reduction in pain levels. Pt reported prior membership to [employer] which she may reactivate; isn't sure if she'll work out here at [address] with free pass or not. Deferred K-tape for pt to assess her pain levels over the weekend without it and determine if able to better manage pain levels by herself and is ready for discharge at next visit. Treatment/Activity Tolerance:  [x] Pt able to complete treatment [] Patient limited by fatique  [] Patient limited by pain  [] Patient limited by other medical complications  [] Other:     Prognosis:  [x] Good [] Fair  [] Poor    Patient Requires Follow-up: [x] Yes  [] No    Return to Play:    [x]  N/A   []  Stage 1: Intro to Strength   []  Stage 2: Return to Run and Strength   []  Stage 3: Return to Jump and Strength   []  Stage 4: Dynamic Strength and Agility   []  Stage 5: Sport Specific Training     []  Ready to Return to Play, Meets All Above Stages   []  Not Ready for Return to Sports   Comments:            PLAN: See eval. PT 2x / week for 6 weeks. Pending PA  [x] Continue per plan of care [] Alter current plan (see comments)  [] Plan of care initiated [] Hold pending MD visit [] Discharge    Electronically signed by: Rere Patel PT, DPT      Note: If patient does not return for scheduled/ recommended follow up visits, this note will serve as a discharge from care along with most recent update on progress.

## 2023-03-09 ENCOUNTER — HOSPITAL ENCOUNTER (OUTPATIENT)
Dept: PHYSICAL THERAPY | Age: 48
Setting detail: THERAPIES SERIES
Discharge: HOME | End: 2023-03-09
Payer: MEDICAID

## 2023-03-09 PROCEDURE — 97530 THERAPEUTIC ACTIVITIES: CPT

## 2023-03-09 PROCEDURE — 97110 THERAPEUTIC EXERCISES: CPT

## 2023-03-09 NOTE — PLAN OF CARE
Isabela Avila  Phone: [phone]   Fax: [phone]     Physical Therapy Discharge Summary    Dear Enos Osler, PA*  ,    We had the pleasure of treating the following patient for physical therapy services at [facility]. A summary of our findings can be found in the discharge summary below. If you have any questions or concerns regarding these findings, please do not hesitate to contact me at the office phone number checked above. Thank you for the referral.     Physician Signature:________________________________Date:__________________  By signing above (or electronic signature), therapists plan is approved by physician      Functional Outcome:   LEFS: raw score = 44; dysfunction = 45%             1/26  LEFS: raw score = 66; dysfunction = 17.5% 3/9      Supine hamstring length 90/90:  lacking 12 deg R, lacking 16 deg L    MMT HIP:  Flex  5/5 R, 4+/5 L  Abd  4+/5 R, 5/5 L  Ext 4/5 R, 4-/5 L      Overall Response to Treatment:   []Patient is responding well to treatment and improvement is noted with regards  to goals   []Patient should continue to improve in reasonable time if they continue HEP   []Patient has plateaued and is no longer responding to skilled PT intervention    []Patient is getting worse and would benefit from return to referring MD   []Patient unable to adhere to initial POC   [x]Other:   Patient progressed well during episode of care w/K-tape to L knee allowing patient to tolerate strengthening and stretching program.  Patient's pain levels have steadily improved throughout session as well. Patient is now IND w/HEP and able to further progress deficits in [facility] w/free 30 day membership, however is worried about language barrier. Did provide pt with a 2nd membership to take one of her family or friends who are [address] speaking to assist with any necessary translation.   Patient is now discharged from PT services. Date range of Visits:  to 3/9/23  Total Visits: 9    Recommendation:    [x] Discharge to HEP. Follow up with PT or MD PRN. Physical Therapy Daily Treatment Note    Date:  2023     Patient Name:  Verner Call    :  1975  MRN: [id number]  Medical Diagnosis:  Other bursitis of knee, left knee [M70.52]  Treatment Diagnosis: LLE gait deviations, decreased L hip strength     Insurance/Certification information:  PT Insurance Information: [employer]. 30 visits pcmayra, [place] required through [facility]  Physician Information:  Irvin Clairema*   refaxed 3/9  Plan of care signed (Y/N): []  Yes [x]  No     Date of Patient follow up with Physician:      Progress Report: [x]  Yes  []  No     Date Range for reporting period:  Beginnin2023  PN:   Ending:  3/9/23    Progress report due (10 Rx/or 30 days whichever is less): visit #8 or 3/85/38 (date)     Recertification due (POC duration/ or 90 days whichever is less): visit # or 23 (date)     Visit # Insurance Allowable Auth required? Date Range   Eval +  32 units, 8 visits [x]  Yes, CCP  []  No  to 3/11/23       Latex Allergy:  [x]NO      []YES  Preferred Language for Healthcare:   []English       [x]other: Wolof, video  required    Functional Scale:       Date assessed:  LEFS: raw score = 44; dysfunction = 45%               LEFS: raw score = 66; dysfunction = 17.5% 3/9    Pain level:  3/10     SUBJECTIVE:   Video  used. Doing okay today, pain levels are minimal.  Did not go to [employer] but did begin working out at home. Is interested on working out in [employer] w/free pass but is worried about language barrier. States someone in home does speak english and would workout with her.       OBJECTIVE:   : Supine hamstring 90/90: lacking 10 deg R, lacking 19 deg L    : RLE crosses midline during gait w/hip ER  : Crepitus noted in all directions with L patellar mobs       RESTRICTIONS/PRECAUTIONS:     Exercises/Interventions:   Therapeutic Exercise (95634)  Resistance / level Sets/sec Reps Notes / Cues   Bike  TM  -side stepping  -retro 2.0    0.7 mph   2.5' B     IB / HR  HSS   6\" block 2x30\"/2x10      Prone TKE       Cables   -TKE  -3-way hip   4.5 pl  1.5 pl    Total gym  -squats     Cybex leg press  Quantum   Knee flex  Knee ext 60#    30#  30#    Cybex leg press -double leg (plate setting 4 849#    [facility]  Navigated 2 flights of stairs reciprocally    Leg press      -LLE only  Knee flex  Cybex Hip abd  FM Knee ext    Precore elliptical   Octane lateral elliptical           50#  20#    Fwd/bwd  Fwd/bwd   1        1  1    3 mins total           15  15                  Gait (61716)                     Therapeutic Activities (14020)       Fwd step up to SLS  Lat step up w/abd kick 6\"  6\" No UE assist  No UE assist   Fwd step up from 2nd step w/contra LE to platform  6\" No UE assist   Side stepping  Lime TB                          Neuromuscular Re-ed (36630)       AirEx  - NBOS w/ trunk twists  - NBOS w/ OH lifts  - SLS   Yellow ball  Yellow ball       Shuttle balance       Tiltboard - A/P, M/L taps      Tiltboard - A/P, M/L balance      Walking lunges 40'  2/23: demo good form following initial instruction   Lunges forward on Bosu Blue side     Lateral lunges on Bosu Blue side      Weight shifts right/left on Bosu Black side    Squats on Bosu Black side    Fwd step ups to SLS on BOSU  Lat step ups to SLS on [employer]                                 Manual Intervention (28074)       Knee mobs/PROM       Tib/Fem Mobs       Patella Mobs into each direction,      Ankle mobs       Stretches:  Hamstring 90/90  Piriformis   IT-band        IASTM w/roller stick to Medial L knee joint, MCL, hip add tubercle      K-tape Ystrip applied L knee with anchor on patellar tendon, Lateral tail at 50% around patella, medial tail <25%    2/14: Application time included pt educated on removal process, wear time       Modalities:     Pt. Education:  01/26/2023  -pt educated on diagnosis, prognosis and expectations for rehab  -all pt questions were answered    Home Exercise Program:  Access Code: [email or web address]  URL: [email or web address]/  Date: 01/26/2023  Prepared by: Roland Umanzor     Exercises  Seated Hamstring Stretch - 3 x daily - 7 x weekly - 3 sets - 30\" hold  Prone Terminal Knee Extension - 2 x daily - 7 x weekly - 1 sets - 10 reps  Squat with Chair Touch - 2 x daily - 7 x weekly - 1 sets - 10 reps  Lunge with Counter Support - 2 x daily - 7 x weekly - 1 sets - 10 reps    Therapeutic Exercise and NMR EXR  [x] (38781) Provided verbal/tactile cueing for activities related to strengthening, flexibility, endurance, ROM for improvements in LE, proximal hip, and core control with self care, mobility, lifting, ambulation.  [] (11132) Provided verbal/tactile cueing for activities related to improving balance, coordination, kinesthetic sense, posture, motor skill, proprioception  to assist with LE, proximal hip, and core control in self care, mobility, lifting, ambulation and eccentric single leg control.   [] (61066) Therapist is in constant attendance of 2 or more patients providing skilled therapy interventions, but not providing any significant amount of measurable one-on-one time to either patient, for improvements in LE, proximal hip, and core control in self care, mobility, lifting, ambulation and eccentric single leg control.      NMR and Therapeutic Activities:    [x] (99448 or 43178) Provided verbal/tactile cueing for activities related to improving balance, coordination, kinesthetic sense, posture, motor skill, proprioception and motor activation to allow for proper function of core, proximal hip and LE with self care and ADLs  [] (75194) Gait Re-education- Provided training and instruction to the patient for proper LE, core and proximal hip recruitment and positioning and eccentric body weight control with ambulation re-education including up and down stairs     Home Exercise Program:    [] (76289) Reviewed/Progressed HEP activities related to strengthening, flexibility, endurance, ROM of core, proximal hip and LE for functional self-care, mobility, lifting and ambulation/stair navigation   [] (39975)Reviewed/Progressed HEP activities related to improving balance, coordination, kinesthetic sense, posture, motor skill, proprioception of core, proximal hip and LE for self care, mobility, lifting, and ambulation/stair navigation      Manual Treatments:  PROM / STM / Oscillations-Mobs:  G-I, II, III, IV (PA's, Inf., Post.)  [] (77463) Provided manual therapy to mobilize LE, proximal hip and/or LS spine soft tissue/joints for the purpose of modulating pain, promoting relaxation,  increasing ROM, reducing/eliminating soft tissue swelling/inflammation/restriction, improving soft tissue extensibility and allowing for proper ROM for normal function with self care, mobility, lifting and ambulation. Modalities:  [] (90987) Vasopneumatic compression: Utilized vasopneumatic compression to decrease edema / swelling for the purpose of improving mobility and quad tone / recruitment which will allow for increased overall function including but not limited to self-care, transfers, ambulation, and ascending / descending stairs.        Charges:  Timed Code Treatment Minutes: 40   Total Treatment Minutes: 40     CPT Code Units approved Units used Date Range   23749 TA 32 5 1/26/23 - 3/11/23   96458 Gait 32     94097 TE 32 12    27809 Manual 32 2    55466 NMR 32 3        [] EVAL - LOW (20825)   [] EVAL - MOD (84366)  [] EVAL - HIGH (37833)  [] RE-EVAL (64701)  [x] HF(93134) x  2     [] Ionto  [] NMR (30400) x        [] Vaso  [] Manual (60180) x      [] Ultrasound  [x] TA x  1      [] Mech Traction (79786)  [] Aquatic Therapy x     [] ES (un) (72946):   [] Home Management Training x  [] ES(attended) (49619)   [] Dry Needling 1-2 muscles (77580):  [] Dry Needling 3+ muscles (088227  [] Group:      [] Other:     GOALS:   Patient stated goal:  alleviate pain  [] Progressing: [] Met: [x] Not Met: [] Adjusted     Therapist goals for Patient:   Short Term Goals: To be achieved in: 2 weeks  1. Independent in HEP and progression per patient tolerance, in order to prevent re-injury. [] Progressing: [x] Met: [] Not Met: [] Adjusted  2. Patient will have a decrease in pain to facilitate improvement in movement, function, and ADLs as indicated by Functional Deficits. [] Progressing: [x] Met: [] Not Met: [] Adjusted     Long Term Goals: To be achieved in: 6 weeks  1. Pt will improve LEFS by 9 points to reduce disability and progress towards PLOF. [x] Progressing: [] Met: [] Not Met: [] Adjusted  2. Patient will demonstrate increased B hamstring length 90/90 AROM to lacking 16 deg to allow for normal knee joint function during gait cycle. [] Progressing: [x] Met:  [] Not Met:  [] Adjusted  3. Patient will demonstrate an increase in B hip strength to at least 4+/5 in order to assist in stabilizing L knee joint at initial contact of gait cycle. [] Progressing: [x] Met: [x] Not Met: [] Adjusted  4. Patient will return to functional activities including driving without increased symptoms or restriction. [] Progressing: [x] Met: [] Not Met: [] Adjusted    Overall Progression Towards Functional goals/ Treatment Progress Update:  [x] Patient is progressing as expected towards functional goals listed. [] Progression is slowed due to complexities/Impairments listed. [] Progression has been slowed due to co-morbidities.   [] Plan just implemented, too soon to assess goals progression <30days   [] Goals require adjustment due to lack of progress  [] Patient is not progressing as expected and requires additional follow up with physician  [] Other    Persisting Functional Limitations/Impairments:  []Sitting []Standing   []Walking []Stairs   []Transfers []ADLs   []Squatting/bending []Kneeling  []Housework []Job related tasks  []Driving []Sports/Recreation   []Sleeping []Other:    ASSESSMENT:   see above    Treatment/Activity Tolerance:  [x] Pt able to complete treatment [] Patient limited by fatique  [] Patient limited by pain  [] Patient limited by other medical complications  [] Other:     Prognosis:  [x] Good [] Fair  [] Poor    Patient Requires Follow-up: [] Yes  [x] No    Return to Play:    [x]  N/A   []  Stage 1: Intro to Strength   []  Stage 2: Return to Run and Strength   []  Stage 3: Return to Jump and Strength   []  Stage 4: Dynamic Strength and Agility   []  Stage 5: Sport Specific Training     []  Ready to Return to Play, Meets All Above Stages   []  Not Ready for Return to Sports   Comments:            PLAN: See eval. PT 2x / week for 6 weeks. Pending PA  [] Continue per plan of care [] Alter current plan (see comments)  [] Plan of care initiated [] Hold pending MD visit [x] Discharge    Electronically signed by: Angela Vo, PT, DPT      Note: If patient does not return for scheduled/ recommended follow up visits, this note will serve as a discharge from care along with most recent update on progress.

## 2023-03-16 ENCOUNTER — APPOINTMENT (OUTPATIENT)
Dept: PHYSICAL THERAPY | Age: 48
End: 2023-03-16
Payer: MEDICAID